# Patient Record
Sex: FEMALE | URBAN - METROPOLITAN AREA
[De-identification: names, ages, dates, MRNs, and addresses within clinical notes are randomized per-mention and may not be internally consistent; named-entity substitution may affect disease eponyms.]

---

## 2017-07-21 ENCOUNTER — EMERGENCY (EMERGENCY)
Facility: HOSPITAL | Age: 57
LOS: 0 days | Discharge: HOME | End: 2017-07-21
Attending: EMERGENCY MEDICINE | Admitting: EMERGENCY MEDICINE
Payer: MEDICAID

## 2017-07-21 VITALS
SYSTOLIC BLOOD PRESSURE: 118 MMHG | TEMPERATURE: 98 F | RESPIRATION RATE: 16 BRPM | HEART RATE: 75 BPM | DIASTOLIC BLOOD PRESSURE: 70 MMHG | OXYGEN SATURATION: 99 %

## 2017-07-21 VITALS — WEIGHT: 175.05 LBS | HEIGHT: 62 IN
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
## 2017-07-21 DIAGNOSIS — S89.92XA UNSPECIFIED INJURY OF LEFT LOWER LEG, INITIAL ENCOUNTER: ICD-10-CM

## 2017-07-21 PROCEDURE — 73590 X-RAY EXAM OF LOWER LEG: CPT | Mod: 26,LT

## 2017-07-21 PROCEDURE — 99283 EMERGENCY DEPT VISIT LOW MDM: CPT

## 2017-07-21 PROCEDURE — 73562 X-RAY EXAM OF KNEE 3: CPT | Mod: 26,LT

## 2017-07-21 RX ORDER — IBUPROFEN 200 MG
600 TABLET ORAL ONCE
Qty: 0 | Refills: 0 | Status: COMPLETED | OUTPATIENT
Start: 2017-07-21 | End: 2017-07-21

## 2017-07-21 RX ADMIN — Medication 600 MILLIGRAM(S): at 20:08
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
## 2017-07-21 NOTE — ED STATDOCS - PROGRESS NOTE DETAILS
Patient seen and evaluated, no fracture on xray, has some bruising and pain over L shin and knee, FROM and strength.  Applied ace wrap to L knee and reviewed RICE and motrin for pain.  Patient to f/u PMD -Nicole Lomax PA-C

## 2017-07-21 NOTE — ED STATDOCS - MEDICAL DECISION MAKING DETAILS
negative xrays, ambulatory in ED. no life threatening traumatic injuries. patient appropriate for discharge home.

## 2019-08-20 PROBLEM — Z00.00 ENCOUNTER FOR PREVENTIVE HEALTH EXAMINATION: Status: ACTIVE | Noted: 2019-08-20

## 2019-08-20 PROBLEM — I10 ESSENTIAL (PRIMARY) HYPERTENSION: Chronic | Status: ACTIVE | Noted: 2017-07-21

## 2019-08-20 PROBLEM — D64.9 ANEMIA, UNSPECIFIED: Chronic | Status: ACTIVE | Noted: 2017-07-21

## 2019-09-10 ENCOUNTER — APPOINTMENT (OUTPATIENT)
Dept: NEUROLOGY | Facility: CLINIC | Age: 59
End: 2019-09-10

## 2019-09-10 VITALS
HEIGHT: 62 IN | WEIGHT: 165 LBS | BODY MASS INDEX: 30.36 KG/M2 | SYSTOLIC BLOOD PRESSURE: 100 MMHG | DIASTOLIC BLOOD PRESSURE: 70 MMHG

## 2019-11-12 ENCOUNTER — APPOINTMENT (OUTPATIENT)
Dept: VASCULAR SURGERY | Facility: CLINIC | Age: 59
End: 2019-11-12
Payer: MEDICAID

## 2019-11-12 ENCOUNTER — APPOINTMENT (OUTPATIENT)
Dept: VASCULAR SURGERY | Facility: CLINIC | Age: 59
End: 2019-11-12

## 2019-11-12 VITALS
OXYGEN SATURATION: 100 % | TEMPERATURE: 97.8 F | WEIGHT: 166 LBS | SYSTOLIC BLOOD PRESSURE: 130 MMHG | BODY MASS INDEX: 33.02 KG/M2 | HEIGHT: 59.5 IN | HEART RATE: 71 BPM | DIASTOLIC BLOOD PRESSURE: 78 MMHG

## 2019-11-12 DIAGNOSIS — I83.819 VARICOSE VEINS OF UNSPECIFIED LOWER EXTREMITY WITH PAIN: ICD-10-CM

## 2019-11-12 DIAGNOSIS — I83.90 ASYMPTOMATIC VARICOSE VEINS OF UNSPECIFIED LOWER EXTREMITY: ICD-10-CM

## 2019-11-12 PROCEDURE — 93970 EXTREMITY STUDY: CPT

## 2019-11-12 PROCEDURE — 99203 OFFICE O/P NEW LOW 30 MIN: CPT

## 2019-11-13 PROBLEM — I83.819 VARICOSE VEINS WITH PAIN: Status: ACTIVE | Noted: 2019-11-12

## 2019-11-13 PROBLEM — I83.90 VARICOSE VEINS: Status: ACTIVE | Noted: 2019-11-12

## 2019-11-13 NOTE — HISTORY OF PRESENT ILLNESS
[FreeTextEntry1] : 58 yo F sent for evaluation of ble varicose veins with pain. She works on her feet all day long. Her legs get swollen at the endo of the day occasionally. She does not use compression stockings. No family hx of varicose veins. Has had sclero in the past.

## 2019-11-13 NOTE — PHYSICAL EXAM
[2+] : right 2+ [Normal Breath Sounds] : Normal breath sounds [Normal Heart Sounds] : normal heart sounds [1+] : left 1+ [Ankle Swelling On The Right] : mild [Ankle Swelling Bilaterally] : bilaterally  [Ankle Swelling (On Exam)] : present [Ankle Swelling On The Left] : moderate [Varicose Veins Of Lower Extremities] : bilaterally [Alert] : alert [No Rash or Lesion] : No rash or lesion [Oriented to Time] : oriented to time [Oriented to Person] : oriented to person [Oriented to Place] : oriented to place [Calm] : calm [JVD] : no jugular venous distention  [] : not present [de-identified] : L para spinal tenderness-severe on palpation [de-identified] : BLE spider-2-3 mm veins along the thighs and shin, non tender

## 2019-11-13 NOTE — REVIEW OF SYSTEMS
[Limb Pain] : limb pain [As Noted in HPI] : as noted in HPI [Negative] : Endocrine [FreeTextEntry9] : BLE pain-difuse

## 2019-11-13 NOTE — DATA REVIEWED
[FreeTextEntry1] : BLe venous US 11/12/19: no DVT, no GSV reflux, 2mm diameter veins with insuf, fuperficial.

## 2019-11-13 NOTE — ASSESSMENT
[FreeTextEntry1] : 60 yo F with BLE pain and lower back pain. 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 She has multiple incompetent superficial veins but no GSV os SSV reflux. This veins are unlikely to be causing her symptoms. Pain may be neuritic in origin.